# Patient Record
Sex: FEMALE | Race: WHITE
[De-identification: names, ages, dates, MRNs, and addresses within clinical notes are randomized per-mention and may not be internally consistent; named-entity substitution may affect disease eponyms.]

---

## 2020-02-09 ENCOUNTER — HOSPITAL ENCOUNTER (OUTPATIENT)
Dept: HOSPITAL 46 - ED | Age: 70
Setting detail: OBSERVATION
LOS: 1 days | Discharge: HOME | End: 2020-02-10
Attending: INTERNAL MEDICINE | Admitting: INTERNAL MEDICINE
Payer: MEDICARE

## 2020-02-09 VITALS — BODY MASS INDEX: 29.23 KG/M2 | WEIGHT: 164.99 LBS | HEIGHT: 63 IN

## 2020-02-09 DIAGNOSIS — I10: ICD-10-CM

## 2020-02-09 DIAGNOSIS — Z88.2: ICD-10-CM

## 2020-02-09 DIAGNOSIS — Z82.49: ICD-10-CM

## 2020-02-09 DIAGNOSIS — Z79.899: ICD-10-CM

## 2020-02-09 DIAGNOSIS — E03.9: ICD-10-CM

## 2020-02-09 DIAGNOSIS — R07.89: Primary | ICD-10-CM

## 2020-02-09 PROCEDURE — G0378 HOSPITAL OBSERVATION PER HR: HCPCS

## 2020-02-09 NOTE — NUR
PATIENT RESTING IN BED.  IN ROOM. VITAL SIGNS AND I&O DONE. ICE WATER
GIVEN. CALL LIGHT WITHIN REACH. NO OTHER NEEDS AT THIS TIME

## 2020-02-09 NOTE — NUR
ARRIVED TO UNIT. USED BATHROOM RIGHT AWAY. URINATED 700ML. STEADY GAIT.
INDEPENDENT IN ROOM. TELE 10 IN PLACE. LUNCH ORDERED FOR PATIENT.

## 2020-02-09 NOTE — NUR
VITALS AND I&OS DONE AND CHARTED. FRESH ICE WATER GIVEN. BEDSIDE TABLE AND
CALL LIGHT IN REACH. PT NEEDS NOTHING MORE AT THIS TIME.

## 2020-02-09 NOTE — NUR
PT ALERT AND ORIENTED. REPORT RECEIVED FROM MADONNA HARVEY. PT DENIES NEEDS OR
CONCERNS. NO PAIN, NAUSEA OR SOB. CALL LIGHT AND H2O IN REACH.

## 2020-02-09 NOTE — NUR
CHECKED ON pt. RESTING IN BED AWAKE, NO REQUESTS AT THIS TIME. HR 64 ON TELE
10. CALL LIGHT IN REACH.

## 2020-02-09 NOTE — NUR
PT RESTING IN SEMIFOWLERS POSITION IN BED TEXTING ON CELL PHONE. PT ALERT AND
ORIENTED. ASSESSMENT COMPLETED. PT DENIES PAIN, SOB, NAUSEA, NUMBNESS/TINGLING
OR ANY OTHER SYMPTOMS AT THIS TIME. CALL LIGHT AND H2O IN REACH. PT DENIES
NEEDS OR CONCERNS.

## 2020-02-09 NOTE — NUR
ADMITTED FOR CHEST PAIN RULE OUT. ASYMPTOMATIC THROUGHOUT SHIFT. TELE 10 IN
PLACE. SINUS RHYTHM. NO CHEST PAIN OR NUMBNESS IN EXTREMITIES. FEELS WELL.

## 2020-02-10 NOTE — EKG
[facility]
                                    [address]
                                  [place], [place]  [address]
_________________________________________________________________________________________
                                                                 Signed   
 
 
Normal sinus rhythm
Nonspecific ST abnormality
Abnormal ECG
When compared with ECG of 30-NOV-2018 08:44,
No significant change was found
Confirmed by STANFORD MARLOW MD ([id number]) on 2/10/2020 7:38:14 AM
 
 
 
 
 
 
 
 
 
 
 
 
 
 
 
 
 
 
 
 
 
 
 
 
 
 
 
 
 
 
 
 
 
 
 
 
 
 
 
    Electronically Signed By: STANFORD MARLOW MD  02/10/20 0738
_________________________________________________________________________________________
PATIENT NAME:     SNEHA HUGGINS                    
MEDICAL RECORD #: [id number]                     Electrocardiogram             
          ACCT #: [id number]  
DATE OF BIRTH:   [birth year]                                       
PHYSICIAN:   STANFORD MARLOW MD                   REPORT #: [id number]
REPORT IS CONFIDENTIAL AND NOT TO BE RELEASED WITHOUT AUTHORIZATION

## 2020-02-10 NOTE — NUR
pt RESTING IN BED WITH EYES CLOSED. BREATHING EQUAL AND UNLABORED. HR 68, SR.
ON TELE 10. LIGHTS OFF IN ROOM.

## 2020-02-10 NOTE — EKG
[facility]
                                    [address]
                                  [place], [place]  [address]
_________________________________________________________________________________________
                                                                 Signed   
 
 
Normal sinus rhythm
Normal ECG
When compared with ECG of 09-FEB-2020 08:01,
No significant change was found
Confirmed by JOSH HOWELL MD ([id number]) on 2/10/2020 8:00:18 PM
 
 
 
 
 
 
 
 
 
 
 
 
 
 
 
 
 
 
 
 
 
 
 
 
 
 
 
 
 
 
 
 
 
 
 
 
 
 
 
 
    Electronically Signed By: JOSH HOWELL MD  02/10/20 2000
_________________________________________________________________________________________
PATIENT NAME:     SNEHA HUGGINS                    
MEDICAL RECORD #: [id number]                     Electrocardiogram             
          ACCT #: [id number]  
DATE OF BIRTH:   [birth year]                                       
PHYSICIAN:   JOSH HOWELL MD           REPORT #: [id number]
REPORT IS CONFIDENTIAL AND NOT TO BE RELEASED WITHOUT AUTHORIZATION

## 2020-02-10 NOTE — NUR
IV SITE REMOVED TIP INTACT. DISCHARGE PACKET GIVEN WITH EDUCATION ON HEART
HEALTHY DIET, FOLLOW UP APPOINTMENT, AND SIGNS AND SYMPTOMS TO SEEK MEDCIAL
ATTENTION EMERGENTLY, SUCH AS IF PT HAS A RE-CURRENT BOUT OF CHEST PAIN. NO
NEW MEDICATIONS. PT ABLE TO VERBALIZE BACK ALL INSTRUCTIONS AND EDUCATIONS.
V/S STABLE.

## 2020-02-10 NOTE — NUR
pt RESTED WELL THROUGHOUT SHIFT. NSR ON TELE 10. pt DENIES ANY PAIN THIS
SHIFT. CSM INTACT. INDEPENDENT IN ROOM. VOIDING QS. ECHO ORDERED FOR TODAY.

## 2020-02-10 NOTE — NUR
PATIENT RESTING IN BED. PATIENT GOES TO USE THE BATHROOM. ICE WATER
GIVEN.PATIENT REFUSED TO TAKE A SHOWER TODAY. CALL LIGHT WITHIN REACH. NO
OTHER NEEDS AT THIS TIME

## 2020-02-10 NOTE — NUR
CHECKED ON pt, AWAKE FOR LAB DRAW. RESTING IN BED. URINE EMPTIED. ASSESSMENT
COMPLETE. pt DENIES ANY PAIN. CSM INTACT. HR REGULAR RHTYHM ON TELE 10. CALL
LIGHT IN REACH. DENIES ADDITIONAL NEEDS AT THIS TIME.

## 2020-02-10 NOTE — NUR
PATIENT SITTING UP IN BED. RN IN ROOM. VITAL SIGNS DONE BY RN. I&O DONE. CALL
LIGHT WITHIN REACH. NO OTHER NEEDS AT THIS TIME

## 2020-02-10 NOTE — NUR
PT DRESSED, SITTING ON BED AND FAMILY IN RM AWAITING DC. PT SAID SHE FEELS
GOOD, FAMILY CAST A WEARY GLANCE HER WAY! PLEASANT, EXTENDED A BLESSING AS
STAFF CAME TO TAKE PT TO CAR. WILL FOLLOW AS NEEDED

## 2020-12-29 ENCOUNTER — HOSPITAL ENCOUNTER (OUTPATIENT)
Dept: HOSPITAL 46 - ED | Age: 70
Setting detail: OBSERVATION
LOS: 1 days | Discharge: HOME | End: 2020-12-30
Attending: SURGERY | Admitting: SURGERY
Payer: MEDICARE

## 2020-12-29 VITALS — BODY MASS INDEX: 29.3 KG/M2 | WEIGHT: 165.35 LBS | HEIGHT: 63 IN

## 2020-12-29 DIAGNOSIS — K86.1: ICD-10-CM

## 2020-12-29 DIAGNOSIS — Z88.2: ICD-10-CM

## 2020-12-29 DIAGNOSIS — Z20.828: ICD-10-CM

## 2020-12-29 DIAGNOSIS — Z11.59: ICD-10-CM

## 2020-12-29 DIAGNOSIS — E07.9: ICD-10-CM

## 2020-12-29 DIAGNOSIS — D3A.020: Primary | ICD-10-CM

## 2020-12-29 DIAGNOSIS — M19.90: ICD-10-CM

## 2020-12-29 DIAGNOSIS — E78.00: ICD-10-CM

## 2020-12-29 DIAGNOSIS — I10: ICD-10-CM

## 2020-12-29 PROCEDURE — C9803 HOPD COVID-19 SPEC COLLECT: HCPCS

## 2020-12-29 PROCEDURE — U0003 INFECTIOUS AGENT DETECTION BY NUCLEIC ACID (DNA OR RNA); SEVERE ACUTE RESPIRATORY SYNDROME CORONAVIRUS 2 (SARS-COV-2) (CORONAVIRUS DISEASE [COVID-19]), AMPLIFIED PROBE TECHNIQUE, MAKING USE OF HIGH THROUGHPUT TECHNOLOGIES AS DESCRIBED BY CMS-2020-01-R: HCPCS

## 2020-12-29 PROCEDURE — G0378 HOSPITAL OBSERVATION PER HR: HCPCS

## 2020-12-29 PROCEDURE — A9270 NON-COVERED ITEM OR SERVICE: HCPCS

## 2020-12-29 PROCEDURE — 0DTJ4ZZ RESECTION OF APPENDIX, PERCUTANEOUS ENDOSCOPIC APPROACH: ICD-10-PCS | Performed by: SURGERY

## 2020-12-29 NOTE — NUR
POST-OP VS COMEPLETE. WDL. UP TO BR WITH SBA TO VOID 600 ML CLEAR YELLOW
URINE. GAIT STEADY. BACK TO BED, SHASHA WELL. SCD'S IN PLACE. IVF INFUSING. PRN
ADMINISTERED FOR 3/10 ABD PAIN. LAP SITES X 3 UNCHANGED. CLEAR LIQUIDS
PROVIDED.

## 2020-12-29 NOTE — NUR
this rn went to er to get this pt, transition of care explained pt assisted
from er josé in 113 to br to void 200 ml - pt independent with ambulation,
denies pain, to bed, oriented to room and call light, npo, Iv bolus started
lr. waiting for covid test, wiped with pre surgical chrlorhexidine, one ring
on left hand - would like tape for ring. pre surgical strait tubing up and
ready,

## 2020-12-29 NOTE — NUR
pt to or for appy - hx of dvt no meds, scds on on way to sx will ask for order
when returns from or due to hx.
high wbc count will update after sx

## 2020-12-29 NOTE — NUR
PT VOID PRE OP AND TRANSFERED TO [facility] WITH SCD'S AND STRAIT TUBING. NO
PRE OP ON CALL ABX CONFIRMED WITH DR SANFORD WHO WAS ON UNIT. IT WAS GIVEN IN
ER? WM BLKT TO PT REPORT GIVEN TO ANGI PEACOCK.

## 2020-12-29 NOTE — NUR
pt to rm 113 after lap appy scope sites x3 draining serous drainage.  re
inforced with gauze over steri strips that are intact.  iv fusing via pump. pt
rates abd pain ok at 3/10 scd's on. call light and cell phone in reach -
denied need to void.
dressing applied to steri stips that are leaking and gown has spot of blood -
wants to wait a few min, get settled then change gown, new clean gown on bed
in anticipation of leaking to stop. pt aware the dr called her family - wm
blkt to pt and she is starting to rest.

## 2020-12-29 NOTE — NUR
IN TO PATIENT ROOM, VITALS OBTAINED BY IVETTE GONZALES WITH SUPERVISION OF RN. PT
C/O OF A SLIGHT PAIN WHEN SHE MOVES OR COUGHS. OFFERED PAIN MEDICATION, PT
WOULD LIKE TO WAIT. RN ADVISED THAT IF PAIN WORSENS TO CALL, PT AGREED. ICE
WATER GIVEN. NO FURTHER NEEDS AT THIS TIME. PATIENT IN BED, SIDE RAILS IN
PLACE AND CALL LIGHT IN HAND.

## 2020-12-29 NOTE — NUR
Spoke with [place], who is well know to me.  She states she is having appendex
out.  Denies needs and plans on dc to home with her spouse.  She has good
family support.  Will check with her tomorrow if there are further needs
following surgery.

## 2020-12-29 NOTE — NUR
12/29/20 1831 Rhiannon Galindo
1821 PATIENT INTO PACU FROM OR, REPORT FROM JENY FLANNERY. PATIENT AWAKE
ON AND OFF. REPORTS PAIN 0/10 ON PAIN SCALE. STERI STRIPS TO ABDOMEN
C/D/I. OXYGEN SATURATION 100% ON 6L MASK.
1825 PATIENT PULLING MASK DOWN OFF OF FACE. TRIALED TITRATING TO ROOM
AIR. PATIENT 100% RA.
1827 PATIENT AWAKE ON AND OFF, NO COMPLAINTS OF PAIN. STERI STRIPS
C/D/I. RESPONDS TO VERBAL STIMULI.

## 2020-12-29 NOTE — NUR
REPORT RECEIVED FROM DAY SHIFT RN. PT LYING IN BED ALERT AND ORIENTED TALKING
ON PHONE. DENIES NEEDS AT THIS TIME. WHITE BOARD UPDATED. CALL LIGHT IN REACH.

## 2020-12-29 NOTE — NUR
CALL LIGHT ANSWERED, ANTIBIOTIC COMPLETE. IVF INFUSING. pt RESTING IN BED ON
CELL PHONE. NO REQUESTS AT THIS TIME.

## 2020-12-29 NOTE — NUR
POST-OP VITALS COMPLETE. PT REPORTS PAIN IS TOLERABLE. DENIES NAUSEA. UP TO BR
WITH SBA TO VOID 300 ML. BACK TO BED, SHASHA WELL. LAP SITES X 3 WITH SMALL
AMOUNT SEROSANG DRAINAGE. ABD NON-DISTENDED. BOWEL TONES ACTIVE. IV ABX
INFUSING. SCD'S IN PLACE.

## 2020-12-30 NOTE — NUR
PTS CALL LIGHT ON. PUMP ALARMING "DISTAL OCCULSION." IV ASSESSED, WNL, FLUSHES
WELL. INFUSION RESTARTED. PT REPORTS SHE HAS NO ADDITIONAL REQUESTS OR
COMPLAINTS AT THIS TIME. CALL LIGHT VICKI EAGLE.

## 2020-12-30 NOTE — NUR
Rounding. Pt in bed. Pt denies nausea but reports 5/10 intolerable pain, PRN
tylenol given. Pt able to take med without difficulty. Pt up to bathroom to
void. Pt back to bed, repositioned to comfort (pt declined up to chair). Pt in
bed, semi fowlers, table and call light within reach.

## 2020-12-30 NOTE — NUR
Rounding. Pt reports minimal pain and no nausea at this time. Pt in bed, table
and call light within reach.

## 2020-12-30 NOTE — NUR
PATIENT AWAKE IN CHAIR. VITALS AND I&OS CHARTED BY RN. CALL LIGHT IN REACH, NO
OTHER NEEDS AT THIS TIME

## 2020-12-30 NOTE — NUR
Pt has DC order. DC packet discussed with pt. Pharmacy to bedside to educate
about DC medications. Pt verbalized understanding of DC instructions,
medications, and follow up care plans. All questions answered. Pt alert and
oriented, breathing even and unlabored, and able to walk with a steady gait.
Pt given PRN pain meds prior to DC due to increasing pain. Pt awaiting her
ride. Told to use call light when she is ready to be taken downstairs in the
wheelchair. Pt in bed for now, table and call light within reach.

## 2020-12-30 NOTE — NUR
ASSESSMENT COMPLETE. PRN ADMINISTERED FOR 3/10 ABD PAIN. LAP SITES X 3 WITH
STERI STRIPS AND GAUZE WITH SMALL AMOUNT OF SEROSANG DRAINAGE. ABD SOFT. BOWEL
TONES ACTIVE. PT REPORTS FLATUS. DENIES NAUSEA. JELLO PROVIDED.

## 2020-12-30 NOTE — NUR
VS AND I&O COMPLETE. UP TO BR WITH SBA TO VOID. GAIT STEADY. BACK TO BED, SHASHA
WELL. DENIES PAIN "I'M JUST SORE". NO NAUSEA. LAP SITES X 3 UNCHANGED.IV ABX
INFUSING PER ORDER.

## 2020-12-30 NOTE — OR
[facility]
                                    [address] [place], [place]  [address]
_________________________________________________________________________________________
                                                                 Signed   
 
 
DATE OF OPERATION:
12/29/2020
 
SURGEON:
Ryan Sanford MD
 
PREOPERATIVE DIAGNOSIS:
Acute appendicitis.
 
POSTOPERATIVE DIAGNOSIS:
Acute appendicitis.
 
PROCEDURE:
Laparoscopic appendectomy (difficult).
 
ANESTHESIA:
General endotracheal, Madan John, CRNA and local 20 mL of 0.25% Marcaine with
epinephrine. 
 
INDICATION:
This 70-year-old white woman presented to the emergency room in the early afternoon
today with increasing right lower abdominal pain.  She had symptoms of nausea and
feeling poorly yesterday.  A month ago, she had similar symptoms that seem to resolve.
A CT scan was performed in her evaluation, which confirmed high probability of
appendicitis with at least two fecaliths and marked inflammatory changes in the right
lower abdomen and dilation of the appendix.  Her white count was noted to be elevated
greater than 16,000.  She has been fluid resuscitated, given intravenous antibiotic
cefoxitin and is now to undergo appendectomy, preferred by laparoscopic approach.  She
understands the risk of bleeding, infection, need for open procedure and other
unforeseen complications and wished to proceed. 
 
FINDINGS:
Indeed appendicitis was noted.  It was not perforated.  The appendix was markedly
inflamed and gangrenous at its base, but without perforation proper.  Dense adhesions of
small bowel to the area were noted, which was taken down with all due care, ultimately
allowing for appendectomy.  The mesentery to the appendix was quite fibrotic.  Good
security of the mesentery was noted and the appendix was excised flush with the cecum. 
 
Other findings internally were lacking.  She had no evidence of hepatic abnormality.
The gallbladder was surgically absent and there was no sign of pancreatitis.  Notably,
she does have a history of chronic recurrent pancreatitis of an idiopathic etiology. 
 
 
    Electronically Signed By: RYAN SANFORD MD  12/30/20 1723
_________________________________________________________________________________________
PATIENT NAME:     SNEHA HUGGINS                    
MEDICAL RECORD #: [id number]            OPERATIVE REPORT              
          ACCT #: [id number]  
DATE OF BIRTH:   [birth year]            REPORT #: [id number]      
PHYSICIAN:        RYAN SANFORD MD                 
PCP:              ALEXEY JEROME NP         
REPORT IS CONFIDENTIAL AND NOT TO BE RELEASED WITHOUT AUTHORIZATION
 
 
                                  [facility]
                                    [address] [place], [place]  [address]
_________________________________________________________________________________________
                                                                 Signed   
 
 
DESCRIPTION OF PROCEDURE:
The patient was brought to the operating room, given a general endotracheal anesthetic.
Preoperative antibiotic cefoxitin had been given.  After satisfactory general
endotracheal anesthesia, the abdomen was prepared with a chlorhexidine solution and
draped sterilely.  The left arm was at the side.  An infraumbilical incision was made
and using an open Montrell cannula technique, the abdomen was entered and pneumoperitoneum
was achieved to a level of 14 mmHg of carbon dioxide gas.  Intraabdominal inspection
showed no sign of ascites or carcinomatosis nor signs of saponification or hepatic
disease.  There was inflammatory fluid in the right paracolic gutter.  The appendix was
not visualized at that point. 
 
A 12 mm epigastric port was then placed and the camera replaced to that site.  With
single instrument manipulation, the cecum could be elevated and the tip of the appendix
could be identified.  Dense scarring of small bowel to the area was noted. 
 
The right lower quadrant 5 mm port was then placed and using two hand manipulation, the
small bowel loops were gently freed from the right lower abdominal process isolating
well the appendix.  The base the appendix appeared to be gangrenous, but without sign of
perforation or abscess.  With various manipulations, the appendix was brought into a
better view.  Small amounts of electrocautery with a hook cautery was used to more fully
mobilize the appendix.  The tinea of the cecum was followed to what would of course be
the origin of the base of the appendix.  It was viable.  It was markedly distorted by
the dense cicatrix of the mesoappendix.  With various manipulations including blunt and
electrocautery dissection, the mesoappendix was ultimately more fully dissected free
ultimately allowing for passage of Endo CARI stapling device, transecting the fibrotic
mesoappendix much better.  This allowed for further dissection ultimately skeletonizing
the appendix itself.  The base of the appendix was quite viable and was transected and
flushed with the cecum using the Endo-CARI stapling device as well.  The __________ was
placed in the endobag and extracted through the infraumbilical port site and passed for
pathology.  Irrigation was undertaken in the right lower quadrant.  Excess irrigation
fluid was suctioned free.  The camera was replaced to the umbilical site and suction of
fluid over the dome of the liver undertaken.  There were no signs of ongoing bleeding or
other problems.  Photographs were taken throughout the procedure.  The trocars were
removed under direct visualization showing no bleeding.  The infraumbilical fascial
incision was reapproximated with interrupted 0 Vicryl suture.  A 20 mL of 0.25% Marcaine
with epinephrine injected locally.  The skin was closed with interrupted 3-0 Vicryl.
Steri-Strips were applied.  The patient was ultimately extubated, transferred to the
recovery room in good condition having suffered no complications. Sponge, needle, and
instrument counts were reported as correct x3. 
 
 
 
 
    Electronically Signed By: RYAN SANFORD MD  12/30/20 1723
_________________________________________________________________________________________
PATIENT NAME:     SNEHA HUGGINS                    
MEDICAL RECORD #: [id number]            OPERATIVE REPORT              
          ACCT #: [id number]  
DATE OF BIRTH:   [birth year]            REPORT #: [id number]      
PHYSICIAN:        RYAN SANFORD MD                 
PCP:              ALEXEY JEROME NP         
REPORT IS CONFIDENTIAL AND NOT TO BE RELEASED WITHOUT AUTHORIZATION
 
 
                                  [address] [place], [place]  [address]
_________________________________________________________________________________________
                                                                 Signed   
 
 
            ________________________________________
            MD DONI Cespedes/JOELLE
Job #:  [id number]
DD:  12/29/2020 18:30:27
DT:  12/29/2020 19:25:51
 
cc:            JOYCELYN Whitehead FNP
 
 
Copies:                                
~
 
 
 
 
 
 
 
 
 
 
 
 
 
 
 
 
 
 
 
 
 
 
 
 
 
 
 
    Electronically Signed By: RYAN SANFORD MD  12/30/20 1723
_________________________________________________________________________________________
PATIENT NAME:     SNEHA HUGGINS                    
MEDICAL RECORD #: [id number]            OPERATIVE REPORT              
          ACCT #: [id number]  
DATE OF BIRTH:   [birth year]            REPORT #: [id number]      
PHYSICIAN:        RYAN SANFORD MD                 
PCP:              ALEXEY JEROME NP         
REPORT IS CONFIDENTIAL AND NOT TO BE RELEASED WITHOUT AUTHORIZATION

## 2020-12-30 NOTE — NUR
PATIENT AWAKE IN BED, DR IN TO SEE PATIENT NOW. VITALS AND I&OS CHARTED. SBA
TO BATHROOM. WILL CALL IF ASSISTANCE IS NEEDED.

## 2020-12-30 NOTE — NUR
Shift report from Angela PEACOCK included:
Pt recovering post lap appy. Lap sites x3 CDI. Pts needed minimal pain meds
for pain control post op last night. Pt tolerating her intake well, no NV. Pt
passing gas, with no BM yet. Pt is SBA to bathroom. Pts IV somewhat positional
through the night.
Pt currently in bed, semi fowlers, on her phone, breathing even and unlabored.
Pt alert and pleasant upon greeting. Pt denies pain and nausea at this time.
Pt lap sites x3 CDI. Pt in bed, table and call light within reach.

## 2020-12-30 NOTE — HP
[facility]
                                    [address] [place], [place]  [address]
_________________________________________________________________________________________
                                                                 Signed   
 
 
ADMISSION DATE:  
12/29/2020
 
REASON FOR ADMISSION:  
Acute appendicitis.
 
HISTORY OF PRESENT ILLNESS:  
This 70-year-old white woman presented to the emergency room, was evaluated by Dr. Licona for right lower abdominal pain.  Her symptoms began most dominantly yesterday,
though she had an episode a month ago of similar such pain.  Her pain included right
lower quadrant discomfort which was increasing and decreasing over time.  She had some
nausea and some vomiting but this was not protracted.  She had nausea only yesterday. 
 
This morning, she had a single bite of toast which was not well tolerated and sips of
water in minimal amounts at approximately 11:00 a.m., presenting to the emergency room
promptly thereafter. 
 
The patient lives in [place], though she previously lived in [place].  Her  is at
work at the [employer].  Her daughter is a nurse practitioner (Farheen Norris) locally. 
 
Her evaluation in the emergency room included a CBC which showed an elevated white count
of 16.5.  Liver enzymes normal and COVID test anticipated, but yet to have been
obtained.  The CT scan that she had in the emergency room showed marked inflammation of
the appendix with at least two fecaliths and surrounding small-bowel which surrounds the
area of inflammation with a highly probable diagnosis of acute appendicitis. 
 
PAST MEDICAL HISTORY:  
Notably significant for chronic idiopathic pancreatitis; she was evaluated at [facility] and underwent laparoscopic cholecystectomy in hopes this would improve
her symptoms, which it did not. 
 
Other medical problems include hypertension and longstanding hypothyroidism.
 
CURRENT MEDICINES:  
Includes Synthroid 88 mcg p.o. daily and lisinopril 20 mg p.o. daily, also ezetimibe 10
mg daily and meloxicam (Mobic) one tablet p.o. daily. 
 
SOCIAL HISTORY:  
She lives in [place] and is . As previously described, her is a nurse
practitioner locally.  She previously lived in [place] and now lives in [place]. 
 
 
    Electronically Signed By: RYAN SANFORD MD  12/30/20 1723
_________________________________________________________________________________________
PATIENT NAME:     SNEHA HUGGINS                    
MEDICAL RECORD #: [id number]            HISTORY AND PHYSICAL          
          ACCT #: [id number]  
DATE OF BIRTH:   [birth year]            REPORT #: [id number]      
PHYSICIAN:        RYAN SANFORD MD                 
PCP:              ALEXEY JEROME NP         
REPORT IS CONFIDENTIAL AND NOT TO BE RELEASED WITHOUT AUTHORIZATION
 
 
                                  [facility]
                                    [address] [place] [address]  [address]
_________________________________________________________________________________________
                                                                 Signed   
 
 
REVIEW OF SYSTEMS:  
Denies epigastric or subcostal or right subscapular pain.  Her pain is primarily in the
right lower abdomen.  She had nausea and minimal vomiting previously, but that has
resolved. 
 
PHYSICAL EXAMINATION:
GENERAL:  Pleasant white woman, who looks to be nontoxic. 
VITAL SIGNS:  Pulse at presentation was 105, temperature 99.7, blood pressure 176/87,
pulse oximetry shows saturation of 96%. 
HEENT:  Mucous membranes are slightly dry.  Trachea is midline. 
CHEST:  Clear. 
HEART:  Regular without murmur. 
ABDOMEN:  Obese, but soft.  Rovsing sign is mildly positive.  There is tenderness over
McBurney's point. 
EXTREMITIES:  Show no clubbing, cyanosis, or edema.
 
LABORATORY STUDIES:  
Show white count of 16.5, hematocrit 42.4, platelets 288,000.  Electrolytes are normal,
creatinine 0.8.  Liver enzymes are normal.  Lipase normal at 27. 
 
Reviewed CT scan and findings are as previously noted.
 
ASSESSMENT:  
The patient has acute appendicitis by clinical and radiographic criteria.  We discussed
the options of management including laparoscopic appendectomy, possible open procedure.
She is not interested in a nonoperative approach nor would I recommended under the
circumstances of her findings including the severity of her inflammation and fecaliths
identified and her age. 
 
The patient has been initiated on antibiotic cefoxitin with IV fluids ordered as well. 
 
As a COVID-19 rapid test is yet to be administered, it will need to be done and the
results available to us before proceeding to general anesthesia.  Even if the test is
positive, she would likely benefit from appendectomy, but such a finding on preoperative
COVID testing would modify the operative team's approach as regards contact and so
forth. 
 
 
 
            ________________________________________
            Ryan Sanford MD 
 
 
    Electronically Signed By: RYAN SANFORD MD  12/30/20 1723
_________________________________________________________________________________________
PATIENT NAME:     SNEHA HUGGINS                    
MEDICAL RECORD #: [id number]            HISTORY AND PHYSICAL          
          ACCT #: [id number]  
DATE OF BIRTH:   [birth year]            REPORT #: [id number]      
PHYSICIAN:        RYAN SANFORD MD                 
PCP:              ALEXEY JEROME NP         
REPORT IS CONFIDENTIAL AND NOT TO BE RELEASED WITHOUT AUTHORIZATION
 
 
                                  [facility]
                                    [address] [facility]
                                  [place], [place]  [address]
_________________________________________________________________________________________
                                                                 Signed   
 
 
 
[facility]
Job #:  [id number]
DD:  12/29/2020 14:33:55
DT:  12/29/2020 18:33:37
 
cc:            JOYCELYN [address]
 
 
Copies:                                
~
 
 
 
 
 
 
 
 
 
 
 
 
 
 
 
 
 
 
 
 
 
 
 
 
 
 
 
 
 
 
    Electronically Signed By: RYAN SANFORD MD  12/30/20 1723
_________________________________________________________________________________________
PATIENT NAME:     SNEHA HUGGINS                    
MEDICAL RECORD #: [id number]            HISTORY AND PHYSICAL          
          ACCT #: [id number]  
DATE OF BIRTH:   [birth year]            REPORT #: [id number]      
PHYSICIAN:        RYAN SANFORD MD                 
PCP:              ALEXEY JEROME NP         
REPORT IS CONFIDENTIAL AND NOT TO BE RELEASED WITHOUT AUTHORIZATION

## 2020-12-30 NOTE — NUR
IV ABX hung as ordered. Pt denies nausea and pain. Pt reports having gotten up
to bathroom a while ago and felt a sting in her umbilical lap site that burned
a little. Pts abdomen assessed, lap sites remain CDI. Pt assessment complete,
VSS, see charting for details. Table and call light within reach.

## 2020-12-30 NOTE — NUR
PT UP TO BR WITH SBA TO VOID. GAIT STEADY. BACK TO BED, SHASHA WELL. SCD'S IN
PLACE. PT DENIES PAIN OR NAUSEA. GAUZE REPLACED OVER UMBILICUS SITE DUE TO
SEROSANG DRAINAGE. BREAKFAST ORDERED. NO FURTHER NEEDS.

## 2020-12-30 NOTE — NUR
PT. HAD AN ACCIDENT IN BED. TRUSTED A FART. GOT HER TO THE BATHROOM AND
CLEANED UP. SHE IS NOW IN HER CHAIR WITH HER BREAKFAST AND CALL LIGHT IN
REACH. LINEN CHANGED. SHE IS A VERY PLEASANT LADY.

## 2021-01-05 NOTE — PATH
[facility]
                                    [address] [place], [place]  [address]
_________________________________________________________________________________________
                                                                 Signed   
 
 
 
SPECIMEN(S): A APPENDIX
 
SPECIMEN SOURCE:
A. APPENDIX
 
CLINICAL HISTORY:
Acute appendicitis.
 
FINAL PATHOLOGIC DIAGNOSIS:
Appendix, appendectomy:
-  Well differentiated neuroendocrine tumor (Carcinoid tumor) with the 
following features: 
     -  Procedure:  Appendectomy.
     -  Tumor site:  Distal half of appendix.
     -  Tumor size:  7 mm in greatest dimension.
     -  Histologic type and grade:  G1:  Well differentiated neuroendocrine 
tumor. 
     -  Mitotic rate:  <2 mitoses/2 mm2.
     -  Ki-67 labeling index: <3%.
     -  Tumor extension:  Tumor invades the muscularis propria.
     -  Margins:  All margins are uninvolved by tumor (proximal and radial 
meseneteric margins). 
     -  Lymphovascular invasion:  Not identified.
     -  Perineural invasion:  Not identified.
     -  Regional lymph nodes:  No nodes submitted or found.
     -  Additional pathologic findings:  Acute appendicitis with evidence of 
perforation. 
     -  Pathologic stage classification (pTNM, AJCC 8th ed.):  pT1 pNX.
 
COMMENT:
Sections demonstrate small nests of tumor cells with neuroendocrine type nuclei 
within the distal appendix centered in the distal tip. Signet ring cells are 
not seen.  Immunohistochemical stains (with 
appropriately staining controls) were performed. The tumor cells are positive 
for synaptophysin and chromogranin, supporting neuroendocrine origin. 500 tumor 
cells were counted, and less than 3% 
labeled with Ki-67. The tumor is not large enough to perform a mitotic count 
over 10 mm2. The combined morphologic and immunophenotypic profile supports the 
diagnosis of well-differentiated 
neuroendocrine tumor.
The results were called to the office of Dr. Nieto on 1/5/21.
 
                                                                                    
_________________________________________________________________________________________
PATIENT NAME:     SNEHA HUGGINS                    
MEDICAL RECORD #: [id number]            PATHOLOGY                     
          ACCT #: [id number]       ACCESSION #: [id number]     
DATE OF BIRTH:   [birth year]            REPORT #: [id number]       
PHYSICIAN:        URI PATHOLOGY              
PCP:              ALEXEY JEROME NP         
REPORT IS CONFIDENTIAL AND NOT TO BE RELEASED WITHOUT AUTHORIZATION
 
 
                                  [facility]
                                    [address] [place], [place]  [address]
_________________________________________________________________________________________
                                                                 Signed   
 
 
As part of [facility]' Quality Improvement Program, this case was 
reviewed by another member of our pathology staff. 
NAL:cml:C1NR
 
MICROSCOPIC EXAMINATION:
Histologic sections of all submitted blocks are examined by light microscopy. 
These findings, together with the gross examination, support the pathologic 
diagnosis. 
 
GROSS DESCRIPTION:
The specimen, labeled "AG," and designated on the requisition "appendix," is 
received in formalin and consists of 
Specimen: Appendix with mesoappendix.
Dimensions: 8.5 x 2.8 x 2.4 cm.
Serosa: Brown-tan, fragmented and congested with adherent white-tan exudate.
Perforation: Possible perforation included in cassette (A1).
Inking: Staple line is inked black.
Mucosa: Pink-tan to brown-tan and devitalized.
Fecalith: Not grossly identified.
Additional: None.
Representative sections are submitted in cassette (A1).
AC (under the direct supervision of a pathologist)
Additional cross sections of the appendix, and the mesoappendix margin, shaved, 
en face are submitted in cassette A2 at the request of Dr. Morel. 
The Gross Description was prepared using a voice recognition system. The report 
was reviewed for accuracy; however, sound-alike word errors, addition and/or 
deletions may occur. If there is any 
question about this report, please contact Client Services.
 
ADDITIONAL NOTES:
Immunohistochemical and/or in situ hybridization studies were performed on this 
case with the appropriate positive controls that react as expected.  This test 
was developed and its performance 
characteristics determined by [facility].  It has not been cleared or 
approved by the U.S. Food and Drug Administration.  The FDA has determined that 
such clearance or approval is not 
necessary.  This test is used for clinical purposes.  It should not be regarded 
as investigational or for research.  [facility] is certified under the 
Clinical Laboratory Improvement 
Amendments of 1988 (CLIA) as qualified to perform high complexity clinical 
laboratory testing. 
 
 
                                                                                    
_________________________________________________________________________________________
PATIENT NAME:     SNEHA HUGGINS                    
MEDICAL RECORD #: [id number]            PATHOLOGY                     
          ACCT #: [id number]       ACCESSION #: [id number]     
DATE OF BIRTH:   [birth year]            REPORT #: [id number]       
PHYSICIAN:        URI GROVER              
PCP:              ALEXEY JEROME NP         
REPORT IS CONFIDENTIAL AND NOT TO BE RELEASED WITHOUT AUTHORIZATION
 
 
                                  [facility]
                                    [address] [place] [address]
_________________________________________________________________________________________
                                                                 Signed   
 
 
PERFORMING LABORATORY:
The technical component was performed by [facility], [address] [place] [id number] (Medical Director: Ansley Hall MD; CLIA# [id number]). 
Professional interpretation was performed by 
[facility], [address] [address] (CLIA# [id number]). 
 
Diagnostician:  Mavis Morel MD
Pathologist
Electronically Signed 01/05/2021
 
 
Copies:                                
~
 
 
 
 
 
 
 
 
 
 
 
 
 
 
 
 
 
 
 
 
 
 
 
 
 
 
 
 
 
                                                                                    
_________________________________________________________________________________________
PATIENT NAME:     SNEHA HUGGINS                    
MEDICAL RECORD #: [id number]            PATHOLOGY                     
          ACCT #: [id number]       ACCESSION #: [id number]     
DATE OF BIRTH:   [birth year]            REPORT #: [id number]       
PHYSICIAN:        URI PATHOLOGY              
PCP:              ALEXEY JEROME NP         
REPORT IS CONFIDENTIAL AND NOT TO BE RELEASED WITHOUT AUTHORIZATION

## 2021-08-23 ENCOUNTER — HOSPITAL ENCOUNTER (EMERGENCY)
Dept: HOSPITAL 46 - ED | Age: 71
Discharge: HOME | End: 2021-08-23
Payer: MEDICARE

## 2021-08-23 VITALS — WEIGHT: 169.01 LBS | HEIGHT: 63 IN | BODY MASS INDEX: 29.95 KG/M2

## 2021-08-23 DIAGNOSIS — I10: ICD-10-CM

## 2021-08-23 DIAGNOSIS — Z79.899: ICD-10-CM

## 2021-08-23 DIAGNOSIS — K86.1: ICD-10-CM

## 2021-08-23 DIAGNOSIS — Z88.2: ICD-10-CM

## 2021-08-23 DIAGNOSIS — U07.1: Primary | ICD-10-CM

## 2021-08-23 PROCEDURE — M0243 CASIRIVI AND IMDEVI INFUSION: HCPCS

## 2021-08-23 PROCEDURE — U0003 INFECTIOUS AGENT DETECTION BY NUCLEIC ACID (DNA OR RNA); SEVERE ACUTE RESPIRATORY SYNDROME CORONAVIRUS 2 (SARS-COV-2) (CORONAVIRUS DISEASE [COVID-19]), AMPLIFIED PROBE TECHNIQUE, MAKING USE OF HIGH THROUGHPUT TECHNOLOGIES AS DESCRIBED BY CMS-2020-01-R: HCPCS

## 2021-08-23 PROCEDURE — C9803 HOPD COVID-19 SPEC COLLECT: HCPCS

## 2021-08-24 NOTE — EKG
[facility]
                                    [address] [place]  [address]
_________________________________________________________________________________________
                                                                 Signed   
 
 
Normal sinus rhythm
Low voltage QRS
Inferior infarct , age undetermined
Abnormal ECG
When compared with ECG of 10-FEB-2020 07:47,
Vent. rate has increased BY  37 BPM
Inferior infarct is now present
ST now depressed in Anterior leads
T wave amplitude has decreased in Anterior leads
QT has lengthened
Confirmed by JOSH HOWELL MD ([id number]) on 8/24/2021 2:29:27 PM
 
 
 
 
 
 
 
 
 
 
 
 
 
 
 
 
 
 
 
 
 
 
 
 
 
 
 
 
 
 
 
 
 
 
    Electronically Signed By: JOSH HOWELL MD  08/24/21 1429
_________________________________________________________________________________________
PATIENT NAME:     SNEHA HUGGINS                    
MEDICAL RECORD #: [id number]                     Electrocardiogram             
          ACCT #: [id number]  
DATE OF BIRTH:   [birth year]                                       
PHYSICIAN:   JOSH HOWELL MD           REPORT #: [id number]
REPORT IS CONFIDENTIAL AND NOT TO BE RELEASED WITHOUT AUTHORIZATION

## 2021-12-04 ENCOUNTER — HOSPITAL ENCOUNTER (EMERGENCY)
Dept: HOSPITAL 46 - ED | Age: 71
Discharge: HOME | End: 2021-12-04
Payer: MEDICARE

## 2021-12-04 VITALS — WEIGHT: 169.01 LBS | BODY MASS INDEX: 29.95 KG/M2 | HEIGHT: 63 IN

## 2021-12-04 DIAGNOSIS — Z79.899: ICD-10-CM

## 2021-12-04 DIAGNOSIS — I82.422: Primary | ICD-10-CM

## 2021-12-04 DIAGNOSIS — I10: ICD-10-CM

## 2021-12-04 DIAGNOSIS — Z88.2: ICD-10-CM

## 2021-12-04 NOTE — XMS
PreManage Notification: SNEHA HUGGINS MRN:[id number]
 
Security Information
 
Security Events
No recent Security Events currently on file
 
 
 
CRITERIA MET
------------
- ED - Positive COVID-19 Lab Result - [facility]
 
 
CARE PROVIDERS
There are no care providers on record at this time.
 
Louisa has no Care Guidelines for this patient.
 
GUTIERREZ VISIT COUNT (12 MO.)
-------------------------------------------------------------------------------------
3 [facility]
-------------------------------------------------------------------------------------
TOTAL 3
-------------------------------------------------------------------------------------
NOTE: Visits indicate total known visits.
 
ED/C VISIT TRACKING (12 MO.)
-------------------------------------------------------------------------------------
12/04/2021 07:08
[facility] [place]
 
TYPE: Emergency
 
COMPLAINT:
- LEFT LEG SWOLLEN, TIGHT, SORE
-------------------------------------------------------------------------------------
08/23/2021 00:16
[facility] [place]
 
TYPE: Emergency
 
COMPLAINT:
- CHEST PAIN,NAUSEA
 
DIAGNOSES:
- COVID-19
- Allergy status to sulfonamides
- Other chronic pancreatitis
- Other chest pain
- Essential (primary) hypertension
- Other long term (current) drug therapy
-------------------------------------------------------------------------------------
12/29/2020 11:19
[facility] [place]
 
TYPE: Emergency
 
COMPLAINT:
 
- ACUTE APPENDICITIS
-------------------------------------------------------------------------------------
 
 
INPATIENT VISIT TRACKING (12 MO.)
-------------------------------------------------------------------------------------
12/29/2020 11:20
[facility] [place]
 
TYPE: Observation
 
COMPLAINT:
- ACUTE APPENDICITIS
 
DIAGNOSES:
- Pure hypercholesterolemia, unspecified
- Allergy status to sulfonamides
- Contact with and (suspected) exposure to other viral communicable diseases
- Essential (primary) hypertension
- Encounter for screening for other viral diseases
- Benign carcinoid tumor of the appendix
- Allergy status to sulfonamides
- Unspecified osteoarthritis, unspecified site
- Other chronic pancreatitis
- Unspecified acute appendicitis
- Disorder of thyroid, unspecified
-------------------------------------------------------------------------------------
 
[email or web address]